# Patient Record
Sex: MALE | Race: WHITE | Employment: FULL TIME | URBAN - METROPOLITAN AREA
[De-identification: names, ages, dates, MRNs, and addresses within clinical notes are randomized per-mention and may not be internally consistent; named-entity substitution may affect disease eponyms.]

---

## 2017-12-26 ENCOUNTER — HOSPITAL ENCOUNTER (EMERGENCY)
Facility: CLINIC | Age: 37
Discharge: HOME | End: 2017-12-26
Attending: EMERGENCY MEDICINE

## 2017-12-26 ENCOUNTER — APPOINTMENT (OUTPATIENT)
Dept: ULTRASOUND IMAGING | Facility: CLINIC | Age: 37
End: 2017-12-26

## 2017-12-26 VITALS
TEMPERATURE: 98.5 F | RESPIRATION RATE: 16 BRPM | WEIGHT: 310 LBS | DIASTOLIC BLOOD PRESSURE: 87 MMHG | HEIGHT: 71 IN | OXYGEN SATURATION: 96 % | BODY MASS INDEX: 43.4 KG/M2 | SYSTOLIC BLOOD PRESSURE: 185 MMHG | HEART RATE: 105 BPM

## 2017-12-26 DIAGNOSIS — L03.125 ACUTE LYMPHANGITIS OF RIGHT LOWER EXTREMITY: Primary | ICD-10-CM

## 2017-12-26 DIAGNOSIS — L03.115 CELLULITIS OF RIGHT LOWER EXTREMITY: ICD-10-CM

## 2017-12-26 LAB
DIRECT EXAM: NORMAL
Lab: NORMAL
SPECIMEN DESCRIPTION: NORMAL
STATUS: NORMAL

## 2017-12-26 PROCEDURE — 99283 EMERGENCY DEPT VISIT LOW MDM: CPT

## 2017-12-26 PROCEDURE — 87804 INFLUENZA ASSAY W/OPTIC: CPT

## 2017-12-26 PROCEDURE — 93971 EXTREMITY STUDY: CPT

## 2017-12-26 RX ORDER — MULTIVIT WITH MINERALS/LUTEIN
250 TABLET ORAL DAILY
COMMUNITY

## 2017-12-26 RX ORDER — DOXYCYCLINE 100 MG/1
100 TABLET ORAL 2 TIMES DAILY
Qty: 14 TABLET | Refills: 0 | Status: ON HOLD | OUTPATIENT
Start: 2017-12-26 | End: 2018-01-04 | Stop reason: HOSPADM

## 2017-12-26 RX ORDER — LANOLIN ALCOHOL/MO/W.PET/CERES
250 CREAM (GRAM) TOPICAL NIGHTLY
COMMUNITY

## 2017-12-26 RX ORDER — VITAMIN B COMPLEX
1 CAPSULE ORAL DAILY
COMMUNITY

## 2017-12-26 ASSESSMENT — PAIN DESCRIPTION - ORIENTATION: ORIENTATION: RIGHT

## 2017-12-26 ASSESSMENT — PAIN DESCRIPTION - PAIN TYPE: TYPE: ACUTE PAIN

## 2017-12-26 ASSESSMENT — PAIN DESCRIPTION - LOCATION: LOCATION: LEG

## 2017-12-26 ASSESSMENT — PAIN SCALES - GENERAL: PAINLEVEL_OUTOF10: 5

## 2017-12-26 NOTE — ED TRIAGE NOTES
Pt presents to the ED with c/o right leg cramping and flu symptoms. Pt has symptoms of nasal congestion, cough, fevers, chills. Pt reports long car rides over the past two days and has reddened areas that are painful to the right leg. Pt denies clot hx. Pt is alert and oriented x4. Ambulatory. Respirations even and non-labored. Will continue to monitor.

## 2017-12-26 NOTE — ED PROVIDER NOTES
[facility]  eMERGENCY dEPARTMENT eNCOUnter      Pt Name: Tracey Oneil  MRN: [id number]  [place] 1980  Date of evaluation: 12/26/2017      CHIEF COMPLAINT       Chief Complaint   Patient presents with    Leg Swelling    Cough         HISTORY OF PRESENT ILLNESS      The patient presents to the emergency department with redness and swelling of his right lower extremity. He recently traveled to and from [place] over the holiday weekend. He is having red streaking and pain in his right calf and into the thigh. He has never had a DVT before. He does have a history of lymphedema. He was wearing compression stockings this weekend, but it was too painful to do so today. He denies prior history of cellulitis. He has also had URI symptoms, but is not sure that he's had a fever. He denies difficulty breathing or swallowing. He is able to eat and drink normally. REVIEW OF SYSTEMS       All systems reviewed and negative unless noted in HPI. The patient denies fever. Denies vision change. Recent sore throat and rhinorrhea. Denies any neck pain or stiffness. Denies chest pain or shortness of breath. No nausea,  vomiting or diarrhea. Denies any dysuria. Denies urinary frequency or hematuria. Denies musculoskeletal injury or pain. Denies any weakness, numbness or focal neurologic deficit. History of lymphedema and chronic stasis change. Right leg redness and swelling. No recent psychiatric issues. No easy bruising or bleeding. Denies any polyuria, polydypsia or history of immunocompromise. PAST MEDICAL HISTORY    has a past medical history of Hyperlipidemia and Lymphedema. SURGICAL HISTORY      has a past surgical history that includes [place] tooth extraction.     CURRENT MEDICATIONS       Previous Medications    ASCORBIC ACID (VITAMIN C) 250 MG TABLET    Take 250 mg by mouth daily    B COMPLEX VITAMINS CAPSULE    Take 1 capsule by mouth daily    NIACIN 250 MG EXTENDED RELEASE CAPSULE    Take 250 mg by mouth nightly    VITAMIN D (CHOLECALCIFEROL) 1000 UNIT TABS TABLET    Take 1,000 Units by mouth daily       ALLERGIES     has No Known Allergies. FAMILY HISTORY     has no family status information on file. family history is not on file. SOCIAL HISTORY      reports that he has never smoked. He has never used smokeless tobacco. He reports that he drinks alcohol. He reports that he does not use drugs. PHYSICAL EXAM     INITIAL VITALS:  height is 5' 11\" (1.803 m) and weight is 140.6 kg (310 lb) (abnormal). His oral temperature is 98.5 °F (36.9 °C). His blood pressure is 185/87 (abnormal) and his pulse is 105. His respiration is 16 and oxygen saturation is 96%. Patient is alert and oriented, in no apparent distress. HEENT is atraumatic. Pupils are PERRL at 4 mm. Mucous membranes moist.  Clear nasal rhinorrhea appreciated. Neck is supple with no lymphadenopathy. No JVD. No meningismus. Heart sounds regular rate and rhythm with no gallops, murmurs, or rubs. Lungs clear, no wheezes, rales or rhonchi. Abdomen: obese, soft, nontender with no pain to palpation. Normal bowel sounds are noted. No rebound or guarding. Musculoskeletal exam shows no evidence of trauma. Single pustule on patient's right leg. Lymphangitis noted on patient's right calf, crease of the right knee, and right thigh. Chronic stasis change bilaterally in lower extremities. Intact distal pulses. Skin: Chronic stasis change in edema. Right LE appearance as above. Neurological exam reveals cranial nerves 2 through 12 grossly intact. Patient has equal  and normal deep tendon reflexes. Psychiatric: no hallucinations or suicidal ideation. Lymphatics.:  No lymphadenopathy. History of lymphedema as noted. Chronic stasis change in lower extremities. Lymphangitis noted in right calf and thigh.       DIFFERENTIAL DIAGNOSIS/ MDM:     DVT, cellulitis, lymphangitis, influenza    DIAGNOSTIC RESULTS         RADIOLOGY:   I directly visualized the following  images and reviewed the radiologist interpretations:  US DUP LOWER EXTREMITY RIGHT GUY   Final Result   No evidence of DVT in the right lower extremity. Imaging Results         US DUP LOWER EXTREMITY RIGHT GUY (Final result)   Result time 12/26/17 18:55:37   Final result by Cece Louis MD (12/26/17 18:55:37)                Impression:    No evidence of DVT in the right lower extremity. Narrative:    EXAMINATION:  DUPLEX VENOUS ULTRASOUND OF THE RIGHT LOWER EXTREMITY, 12/26/2017 6:22 pm    TECHNIQUE:  Duplex ultrasound and Doppler images were obtained of the right lower  extremity. COMPARISON:  None. HISTORY:  ORDERING SYSTEM PROVIDED HISTORY: pain and swelling; redness  TECHNOLOGIST PROVIDED HISTORY:  Ordering Physician Provided Reason for Exam: Right leg pain and swelling x  couple days s/p car travel x many hrs over the holidays  Acuity: Acute  Type of Exam: Initial    FINDINGS:  The visualized veins of the right lower extremity are patent and free of  echogenic thrombus. The veins are normally compressible and have normal  phasic flow. LABS:  Results for orders placed or performed during the hospital encounter of 12/26/17   Rapid Influenza A/B Antigens   Result Value Ref Range    Specimen Description . NASOPHARYNGEAL SWAB     Special Requests NOT REPORTED     Direct Exam PRESUMPTIVE NEGATIVE for Influenza A + B antigens. Direct Exam       PCR testing to confirm this result is available upon request.  Specimen will be    Direct Exam        saved in the laboratory for 7 days. Please call [phone] if PCR testing is    Direct Exam  indicated.      Direct Exam       Performed at [facility] Emergency Dept and [facility], [address] [place] [address] Bronson Guzman, [address] [place] [address]     Status FINAL 12/26/2017          EMERGENCY DEPARTMENT COURSE: Vitals:    Vitals:    12/26/17 1753   BP: (!) 185/87   Pulse: 105   Resp: 16   Temp: 98.5 °F (36.9 °C)   TempSrc: Oral   SpO2: 96%   Weight: (!) 140.6 kg (310 lb)   Height: 5' 11\" (1.803 m)     -------------------------  BP: (!) 185/87, Temp: 98.5 °F (36.9 °C), Pulse: 105, Resp: 16      Re-evaluation Notes    At this time, the patient has lymphangitis and cellulitis without DVT. I explained that he needs to have a wound check in the next couple of days. If his symptoms worsen, I explained he would need hospitalization. The patient expressed understanding. He will be started on doxycycline. He should apply warm compresses and elevate. The patient is discharged in good condition. FINAL IMPRESSION      1. Acute lymphangitis of right lower extremity    2.  Cellulitis of right lower extremity          DISPOSITION/PLAN   DISPOSITION Decision To Discharge 12/26/2017 07:02:47 PM      Condition on Disposition    good    PATIENT REFERRED TO:  Gladys Prince MD  [address]  [phone]    In 3 days  For wound re-check      DISCHARGE MEDICATIONS:  New Prescriptions    DOXYCYCLINE MONOHYDRATE (ADOXA) 100 MG TABLET    Take 1 tablet by mouth 2 times daily for 7 days       (Please note that portions of this note were completed with a voice recognition program.  Efforts were made to edit the dictations but occasionally words are mis-transcribed.)    Storm MD   Attending Emergency Physician       Giulia Arango MD  12/26/17 9014

## 2017-12-27 NOTE — ED NOTES
Discharge instructions reviewed, prescriptions given, and follow up information given.      Manpreet Motley RN  12/26/17 7330

## 2018-01-02 ENCOUNTER — HOSPITAL ENCOUNTER (INPATIENT)
Age: 38
LOS: 2 days | Discharge: HOME | DRG: 603 | End: 2018-01-04
Attending: EMERGENCY MEDICINE | Admitting: INTERNAL MEDICINE

## 2018-01-02 DIAGNOSIS — L03.115 CELLULITIS OF LEG, RIGHT: Primary | ICD-10-CM

## 2018-01-02 PROBLEM — L03.90 CELLULITIS: Status: ACTIVE | Noted: 2018-01-02

## 2018-01-02 LAB
ABSOLUTE EOS #: 0.3 K/UL (ref 0–0.4)
ABSOLUTE IMMATURE GRANULOCYTE: ABNORMAL K/UL (ref 0–0.3)
ABSOLUTE LYMPH #: 2 K/UL (ref 1–4.8)
ABSOLUTE MONO #: 0.6 K/UL (ref 0.1–1.2)
ANION GAP SERPL CALCULATED.3IONS-SCNC: 12 MMOL/L (ref 9–17)
BASOPHILS # BLD: 1 % (ref 0–2)
BASOPHILS ABSOLUTE: 0.1 K/UL (ref 0–0.2)
BUN BLDV-MCNC: 20 MG/DL (ref 6–20)
BUN/CREAT BLD: ABNORMAL (ref 9–20)
CALCIUM SERPL-MCNC: 8.6 MG/DL (ref 8.6–10.4)
CHLORIDE BLD-SCNC: 103 MMOL/L (ref 98–107)
CO2: 25 MMOL/L (ref 20–31)
CREAT SERPL-MCNC: 0.9 MG/DL (ref 0.7–1.2)
DIFFERENTIAL TYPE: ABNORMAL
EOSINOPHILS RELATIVE PERCENT: 4 % (ref 1–4)
GFR AFRICAN AMERICAN: >60 ML/MIN
GFR NON-AFRICAN AMERICAN: >60 ML/MIN
GFR SERPL CREATININE-BSD FRML MDRD: ABNORMAL ML/MIN/{1.73_M2}
GFR SERPL CREATININE-BSD FRML MDRD: ABNORMAL ML/MIN/{1.73_M2}
GLUCOSE BLD-MCNC: 107 MG/DL (ref 70–99)
HCT VFR BLD CALC: 41.5 % (ref 41–53)
HEMOGLOBIN: 14.3 G/DL (ref 13.5–17.5)
IMMATURE GRANULOCYTES: ABNORMAL %
LYMPHOCYTES # BLD: 22 % (ref 24–44)
MCH RBC QN AUTO: 30.1 PG (ref 26–34)
MCHC RBC AUTO-ENTMCNC: 34.5 G/DL (ref 31–37)
MCV RBC AUTO: 87.4 FL (ref 80–100)
MONOCYTES # BLD: 7 % (ref 2–11)
PDW BLD-RTO: 14.1 % (ref 12.5–15.4)
PLATELET # BLD: 349 K/UL (ref 140–450)
PLATELET ESTIMATE: ABNORMAL
PMV BLD AUTO: 7.4 FL (ref 6–12)
POTASSIUM SERPL-SCNC: 3.9 MMOL/L (ref 3.7–5.3)
RBC # BLD: 4.75 M/UL (ref 4.5–5.9)
RBC # BLD: ABNORMAL 10*6/UL
SEG NEUTROPHILS: 66 % (ref 36–66)
SEGMENTED NEUTROPHILS ABSOLUTE COUNT: 5.8 K/UL (ref 1.8–7.7)
SODIUM BLD-SCNC: 140 MMOL/L (ref 135–144)
WBC # BLD: 8.8 K/UL (ref 3.5–11)
WBC # BLD: ABNORMAL 10*3/UL

## 2018-01-02 PROCEDURE — 6360000002 HC RX W HCPCS: Performed by: EMERGENCY MEDICINE

## 2018-01-02 PROCEDURE — 2580000003 HC RX 258: Performed by: EMERGENCY MEDICINE

## 2018-01-02 PROCEDURE — 2500000003 HC RX 250 WO HCPCS: Performed by: EMERGENCY MEDICINE

## 2018-01-02 PROCEDURE — 96365 THER/PROPH/DIAG IV INF INIT: CPT

## 2018-01-02 PROCEDURE — 80048 BASIC METABOLIC PNL TOTAL CA: CPT

## 2018-01-02 PROCEDURE — 85025 COMPLETE CBC W/AUTO DIFF WBC: CPT

## 2018-01-02 PROCEDURE — 36415 COLL VENOUS BLD VENIPUNCTURE: CPT

## 2018-01-02 PROCEDURE — 99284 EMERGENCY DEPT VISIT MOD MDM: CPT

## 2018-01-02 PROCEDURE — 1200000000 HC SEMI PRIVATE

## 2018-01-02 PROCEDURE — 87040 BLOOD CULTURE FOR BACTERIA: CPT

## 2018-01-02 RX ORDER — ONDANSETRON 2 MG/ML
4 INJECTION INTRAMUSCULAR; INTRAVENOUS EVERY 6 HOURS PRN
Status: DISCONTINUED | OUTPATIENT
Start: 2018-01-02 | End: 2018-01-04 | Stop reason: HOSPADM

## 2018-01-02 RX ORDER — SODIUM CHLORIDE 9 MG/ML
INJECTION, SOLUTION INTRAVENOUS CONTINUOUS
Status: DISCONTINUED | OUTPATIENT
Start: 2018-01-02 | End: 2018-01-04 | Stop reason: HOSPADM

## 2018-01-02 RX ORDER — POTASSIUM CHLORIDE 20MEQ/15ML
40 LIQUID (ML) ORAL PRN
Status: DISCONTINUED | OUTPATIENT
Start: 2018-01-02 | End: 2018-01-04 | Stop reason: HOSPADM

## 2018-01-02 RX ORDER — MULTIVIT WITH MINERALS/LUTEIN
250 TABLET ORAL DAILY
Status: DISCONTINUED | OUTPATIENT
Start: 2018-01-03 | End: 2018-01-04 | Stop reason: HOSPADM

## 2018-01-02 RX ORDER — SODIUM CHLORIDE 0.9 % (FLUSH) 0.9 %
10 SYRINGE (ML) INJECTION PRN
Status: DISCONTINUED | OUTPATIENT
Start: 2018-01-02 | End: 2018-01-04 | Stop reason: HOSPADM

## 2018-01-02 RX ORDER — MORPHINE SULFATE 4 MG/ML
4 INJECTION, SOLUTION INTRAMUSCULAR; INTRAVENOUS
Status: DISCONTINUED | OUTPATIENT
Start: 2018-01-02 | End: 2018-01-04 | Stop reason: HOSPADM

## 2018-01-02 RX ORDER — CLINDAMYCIN PHOSPHATE 900 MG/50ML
900 INJECTION INTRAVENOUS ONCE
Status: COMPLETED | OUTPATIENT
Start: 2018-01-02 | End: 2018-01-02

## 2018-01-02 RX ORDER — SODIUM CHLORIDE 0.9 % (FLUSH) 0.9 %
10 SYRINGE (ML) INJECTION EVERY 12 HOURS SCHEDULED
Status: DISCONTINUED | OUTPATIENT
Start: 2018-01-02 | End: 2018-01-04 | Stop reason: HOSPADM

## 2018-01-02 RX ORDER — CLINDAMYCIN PHOSPHATE 900 MG/50ML
900 INJECTION INTRAVENOUS EVERY 8 HOURS
Status: DISCONTINUED | OUTPATIENT
Start: 2018-01-03 | End: 2018-01-04 | Stop reason: HOSPADM

## 2018-01-02 RX ORDER — ACETAMINOPHEN 325 MG/1
650 TABLET ORAL EVERY 4 HOURS PRN
Status: DISCONTINUED | OUTPATIENT
Start: 2018-01-02 | End: 2018-01-04 | Stop reason: HOSPADM

## 2018-01-02 RX ORDER — POTASSIUM CHLORIDE 7.45 MG/ML
10 INJECTION INTRAVENOUS PRN
Status: DISCONTINUED | OUTPATIENT
Start: 2018-01-02 | End: 2018-01-04 | Stop reason: HOSPADM

## 2018-01-02 RX ORDER — HYDROCODONE BITARTRATE AND ACETAMINOPHEN 5; 325 MG/1; MG/1
1 TABLET ORAL EVERY 4 HOURS PRN
Status: DISCONTINUED | OUTPATIENT
Start: 2018-01-02 | End: 2018-01-04 | Stop reason: HOSPADM

## 2018-01-02 RX ORDER — MORPHINE SULFATE 2 MG/ML
2 INJECTION, SOLUTION INTRAMUSCULAR; INTRAVENOUS
Status: DISCONTINUED | OUTPATIENT
Start: 2018-01-02 | End: 2018-01-04 | Stop reason: HOSPADM

## 2018-01-02 RX ORDER — BISACODYL 10 MG
10 SUPPOSITORY, RECTAL RECTAL DAILY PRN
Status: DISCONTINUED | OUTPATIENT
Start: 2018-01-02 | End: 2018-01-04 | Stop reason: HOSPADM

## 2018-01-02 RX ORDER — 0.9 % SODIUM CHLORIDE 0.9 %
1000 INTRAVENOUS SOLUTION INTRAVENOUS ONCE
Status: COMPLETED | OUTPATIENT
Start: 2018-01-02 | End: 2018-01-02

## 2018-01-02 RX ORDER — HYDROCODONE BITARTRATE AND ACETAMINOPHEN 5; 325 MG/1; MG/1
2 TABLET ORAL EVERY 4 HOURS PRN
Status: DISCONTINUED | OUTPATIENT
Start: 2018-01-02 | End: 2018-01-04 | Stop reason: HOSPADM

## 2018-01-02 RX ORDER — POTASSIUM CHLORIDE 20 MEQ/1
40 TABLET, EXTENDED RELEASE ORAL PRN
Status: DISCONTINUED | OUTPATIENT
Start: 2018-01-02 | End: 2018-01-04 | Stop reason: HOSPADM

## 2018-01-02 RX ORDER — MAGNESIUM SULFATE 1 G/100ML
1 INJECTION INTRAVENOUS PRN
Status: DISCONTINUED | OUTPATIENT
Start: 2018-01-02 | End: 2018-01-04 | Stop reason: HOSPADM

## 2018-01-02 RX ORDER — KETOROLAC TROMETHAMINE 30 MG/ML
30 INJECTION, SOLUTION INTRAMUSCULAR; INTRAVENOUS ONCE
Status: COMPLETED | OUTPATIENT
Start: 2018-01-02 | End: 2018-01-02

## 2018-01-02 RX ORDER — NICOTINE 21 MG/24HR
1 PATCH, TRANSDERMAL 24 HOURS TRANSDERMAL DAILY PRN
Status: DISCONTINUED | OUTPATIENT
Start: 2018-01-02 | End: 2018-01-04 | Stop reason: HOSPADM

## 2018-01-02 RX ADMIN — KETOROLAC TROMETHAMINE 30 MG: 30 INJECTION, SOLUTION INTRAMUSCULAR at 21:51

## 2018-01-02 RX ADMIN — CLINDAMYCIN PHOSPHATE 900 MG: 18 INJECTION, SOLUTION INTRAMUSCULAR; INTRAVENOUS at 20:27

## 2018-01-02 RX ADMIN — SODIUM CHLORIDE 1000 ML: 9 INJECTION, SOLUTION INTRAVENOUS at 20:22

## 2018-01-02 ASSESSMENT — PAIN DESCRIPTION - FREQUENCY: FREQUENCY: CONTINUOUS

## 2018-01-02 ASSESSMENT — ENCOUNTER SYMPTOMS
DIARRHEA: 0
SORE THROAT: 0
ABDOMINAL PAIN: 0
SHORTNESS OF BREATH: 0
NAUSEA: 0
VOMITING: 0

## 2018-01-02 ASSESSMENT — PAIN DESCRIPTION - DESCRIPTORS: DESCRIPTORS: ACHING

## 2018-01-02 ASSESSMENT — PAIN SCALES - GENERAL
PAINLEVEL_OUTOF10: 1
PAINLEVEL_OUTOF10: 4

## 2018-01-02 ASSESSMENT — PAIN DESCRIPTION - ORIENTATION: ORIENTATION: RIGHT;LOWER

## 2018-01-02 ASSESSMENT — PAIN DESCRIPTION - LOCATION: LOCATION: LEG

## 2018-01-02 ASSESSMENT — PAIN DESCRIPTION - PAIN TYPE: TYPE: ACUTE PAIN

## 2018-01-03 VITALS
HEIGHT: 71 IN | HEART RATE: 78 BPM | BODY MASS INDEX: 44.1 KG/M2 | TEMPERATURE: 97.7 F | SYSTOLIC BLOOD PRESSURE: 125 MMHG | RESPIRATION RATE: 18 BRPM | OXYGEN SATURATION: 97 % | DIASTOLIC BLOOD PRESSURE: 82 MMHG | WEIGHT: 315 LBS

## 2018-01-03 PROBLEM — I89.0 CHRONIC ACQUIRED LYMPHEDEMA: Status: ACTIVE | Noted: 2018-01-03

## 2018-01-03 PROBLEM — G47.33 OBSTRUCTIVE SLEEP APNEA: Status: ACTIVE | Noted: 2018-01-03

## 2018-01-03 PROBLEM — E66.01 MORBID OBESITY WITH BMI OF 40.0-44.9, ADULT (HCC): Status: ACTIVE | Noted: 2018-01-03

## 2018-01-03 LAB
ABSOLUTE EOS #: 0.3 K/UL (ref 0–0.4)
ABSOLUTE IMMATURE GRANULOCYTE: ABNORMAL K/UL (ref 0–0.3)
ABSOLUTE LYMPH #: 1.9 K/UL (ref 1–4.8)
ABSOLUTE MONO #: 0.8 K/UL (ref 0.2–0.8)
ALBUMIN SERPL-MCNC: 3.6 G/DL (ref 3.5–5.2)
ALBUMIN/GLOBULIN RATIO: ABNORMAL (ref 1–2.5)
ALP BLD-CCNC: 65 U/L (ref 40–129)
ALT SERPL-CCNC: 37 U/L (ref 5–41)
ANION GAP SERPL CALCULATED.3IONS-SCNC: 10 MMOL/L (ref 9–17)
AST SERPL-CCNC: 22 U/L
BASOPHILS # BLD: 0 % (ref 0–2)
BASOPHILS ABSOLUTE: 0 K/UL (ref 0–0.2)
BILIRUB SERPL-MCNC: 0.32 MG/DL (ref 0.3–1.2)
BUN BLDV-MCNC: 17 MG/DL (ref 6–20)
BUN/CREAT BLD: 16 (ref 9–20)
CALCIUM SERPL-MCNC: 7.8 MG/DL (ref 8.6–10.4)
CHLORIDE BLD-SCNC: 105 MMOL/L (ref 98–107)
CO2: 26 MMOL/L (ref 20–31)
CREAT SERPL-MCNC: 1.05 MG/DL (ref 0.7–1.2)
DIFFERENTIAL TYPE: ABNORMAL
EOSINOPHILS RELATIVE PERCENT: 4 % (ref 1–4)
GFR AFRICAN AMERICAN: >60 ML/MIN
GFR NON-AFRICAN AMERICAN: >60 ML/MIN
GFR SERPL CREATININE-BSD FRML MDRD: ABNORMAL ML/MIN/{1.73_M2}
GFR SERPL CREATININE-BSD FRML MDRD: ABNORMAL ML/MIN/{1.73_M2}
GLUCOSE BLD-MCNC: 103 MG/DL (ref 70–99)
HCT VFR BLD CALC: 37.9 % (ref 41–53)
HEMOGLOBIN: 12.7 G/DL (ref 13.5–17.5)
IMMATURE GRANULOCYTES: ABNORMAL %
LYMPHOCYTES # BLD: 25 % (ref 24–44)
MCH RBC QN AUTO: 29.9 PG (ref 26–34)
MCHC RBC AUTO-ENTMCNC: 33.5 G/DL (ref 31–37)
MCV RBC AUTO: 89.3 FL (ref 80–100)
MONOCYTES # BLD: 10 % (ref 1–7)
PDW BLD-RTO: 14.4 % (ref 11.5–14.5)
PLATELET # BLD: 351 K/UL (ref 130–400)
PLATELET ESTIMATE: ABNORMAL
PMV BLD AUTO: 7.2 FL (ref 6–12)
POTASSIUM SERPL-SCNC: 4 MMOL/L (ref 3.7–5.3)
RBC # BLD: 4.24 M/UL (ref 4.5–5.9)
RBC # BLD: ABNORMAL 10*6/UL
SEG NEUTROPHILS: 61 % (ref 36–66)
SEGMENTED NEUTROPHILS ABSOLUTE COUNT: 4.7 K/UL (ref 1.8–7.7)
SODIUM BLD-SCNC: 141 MMOL/L (ref 135–144)
TOTAL PROTEIN: 6.5 G/DL (ref 6.4–8.3)
WBC # BLD: 7.8 K/UL (ref 3.5–11)
WBC # BLD: ABNORMAL 10*3/UL

## 2018-01-03 PROCEDURE — 2580000003 HC RX 258: Performed by: NURSE PRACTITIONER

## 2018-01-03 PROCEDURE — 1200000000 HC SEMI PRIVATE

## 2018-01-03 PROCEDURE — 85025 COMPLETE CBC W/AUTO DIFF WBC: CPT

## 2018-01-03 PROCEDURE — 36415 COLL VENOUS BLD VENIPUNCTURE: CPT

## 2018-01-03 PROCEDURE — 2500000003 HC RX 250 WO HCPCS: Performed by: NURSE PRACTITIONER

## 2018-01-03 PROCEDURE — 6370000000 HC RX 637 (ALT 250 FOR IP): Performed by: NURSE PRACTITIONER

## 2018-01-03 PROCEDURE — 99222 1ST HOSP IP/OBS MODERATE 55: CPT | Performed by: INTERNAL MEDICINE

## 2018-01-03 PROCEDURE — 6360000002 HC RX W HCPCS: Performed by: NURSE PRACTITIONER

## 2018-01-03 PROCEDURE — 80053 COMPREHEN METABOLIC PANEL: CPT

## 2018-01-03 RX ORDER — DIPHENHYDRAMINE HCL 25 MG
25 TABLET ORAL EVERY 6 HOURS PRN
Status: DISCONTINUED | OUTPATIENT
Start: 2018-01-03 | End: 2018-01-04 | Stop reason: HOSPADM

## 2018-01-03 RX ADMIN — ASCORBIC ACID TAB 250 MG 250 MG: 250 TAB at 09:29

## 2018-01-03 RX ADMIN — CLINDAMYCIN PHOSPHATE 900 MG: 18 INJECTION, SOLUTION INTRAMUSCULAR; INTRAVENOUS at 21:00

## 2018-01-03 RX ADMIN — CLINDAMYCIN PHOSPHATE 900 MG: 18 INJECTION, SOLUTION INTRAMUSCULAR; INTRAVENOUS at 04:33

## 2018-01-03 RX ADMIN — CLINDAMYCIN PHOSPHATE 900 MG: 18 INJECTION, SOLUTION INTRAMUSCULAR; INTRAVENOUS at 12:20

## 2018-01-03 RX ADMIN — SODIUM CHLORIDE: 9 INJECTION, SOLUTION INTRAVENOUS at 00:30

## 2018-01-03 RX ADMIN — SODIUM CHLORIDE, PRESERVATIVE FREE 10 ML: 5 INJECTION INTRAVENOUS at 00:35

## 2018-01-03 RX ADMIN — ENOXAPARIN SODIUM 40 MG: 40 INJECTION SUBCUTANEOUS at 09:29

## 2018-01-03 RX ADMIN — DIPHENHYDRAMINE HCL 25 MG: 25 TABLET ORAL at 21:13

## 2018-01-03 RX ADMIN — SODIUM CHLORIDE: 9 INJECTION, SOLUTION INTRAVENOUS at 11:41

## 2018-01-03 NOTE — H&P
[address] [facility]  Internal Medicine History and Physical      Name: Zander Downey  :   1980  MRN: [id number]     Acct: [de-identified]  [de-identified]  Admit Date: 2018  Hospital: [address] [place] [address]    PCP: Monique Jefferson M.D., MD    CHIEF COMPLAINT:    Chief Complaint   Patient presents with    Cellulitis     right leg x1.5 weeks       History Obtained From:  patient, electronic medical record    HISTORY OF PRESENT ILLNESS:    Zander Downey is a 40 y.o.  male who presents from the emergency department with a complaint of cellulitis of his right leg. Patient reports that he was seen in the emergency room prior and had a venous Doppler  (his 4th) that was unremarkable. He was placed on doxycycline which she completed the morning of admission after 7 days of therapy. The right lower extremity edema is chronic. The erythema has only improved minimally. He is currently on Cleocin 900 mg every 8 hours. Patient has a history of chronic right lower extremity lymphedema. He [place] [facility] and is currently enrolled in the l[place]hedema clinic at [facility]. Evidently attempted to access those records however they are not available. He also has a history of morbid obesity and sleep apnea. He has been using CPAP for the past year. Patient has history of hyperlipidemia. Past Medical History:    Past Medical History:   Diagnosis Date    Hyperlipidemia     Lymphedema         Past Surgical History:    Past Surgical History:   Procedure Laterality Date    WISDOM TOOTH EXTRACTION          Medications Prior to Admission:     Prior to Admission medications    Medication Sig Start Date End Date Taking?  Authorizing Provider   niacin 250 MG extended release capsule Take 250 mg by mouth nightly   Yes Historical Provider, MD   b complex vitamins capsule Take 1 capsule by mouth daily   Yes Historical Provider, MD   Ascorbic Acid (VITAMIN C) 250 MG tablet Take 250 mg

## 2018-01-03 NOTE — ED NOTES
Spoke with Valdo Britt (supervisor at [facility]) room 2005 given however 4-5 patients are ahead of Mr. Samuels. Stat clean has been implemented.      Marielos Jeong RN  01/02/18 8925

## 2018-01-03 NOTE — PROGRESS NOTES
Pharmacy Accuracy Service Medication History Note    The patient's list of current home medications has been reviewed. He does not take any prescription maintenance medications. Source(s) of information: patient, electronic chart    Based on information provided by the above source(s), no changes to the patient's home medication list were necessary. Discrepancies on current hospital orders that need to be addressed by a physician:      Please feel free to call me with any questions about this encounter. Thank you.     Cayetano Marks PharmD  Pharmacy Medication Accuracy Review Service  Phone:  [phone]  Fax: [phone]      Electronically signed by ANSHU Patel [facility] on 1/3/2018 at 6:24 PM

## 2018-01-03 NOTE — CARE COORDINATION
for law firm. Started having swelling and cramping rt [id number]. Developed redness 12-25-18 and went to [facility] ER and sent home on PO ATB. Saw PCP 12-29-18 and continued on ATB until yesterday. Went to [facility] [address] [facility] [place] yesterday since pt felt he may need more ATB. Sent to [facility] ER and admitted. Pt states has history of lymphedema and wears Jobst stockings. Has not been seen at lymphedema clinic at [facility] for 1 1/2 -2 years. Pt is self pay and over income for Medicaid or script assist per Raj Alatorre in HELP. Wife has new job and not yet covered by insurance. No needs identified at this time.                             Electronically signed by Gabino Serrano RN on 1/3/18 at 11:54 AM

## 2018-01-03 NOTE — ED PROVIDER NOTES
[facility]  [address] [facility] [address]  Phone: [phone]    eMERGENCY dEPARTMENT eNCOUnter          Pt Name: Gladis Hinton  MRN: [id number]  [place] 1980  Date of evaluation: 1/2/2018      CHIEF COMPLAINT       Chief Complaint   Patient presents with    Cellulitis     right leg x1.5 weeks       HISTORY OF PRESENT ILLNESS         Gladis Hinton is a 40 y.o. male who presents with cellulitis to his right leg. Patient reports he was seen here recently and had a DVT study that was unremarkable. he tells me that he took his last dose of doxycycline this morning at 8 AM after 1 week of being on the medication. He reports the edema is not improved over the erythema has improved minimally. He denies any history of DVTs in the past.  Denies any chest pain or difficulty breathing. Denies fevers. Does have minimal discomfort with the leg. Does not report any worsening swelling just not improving at this time. Denies other concerns. No trauma. There is a scab to the right mid shin that he reports is where the erythema began. REVIEW OF SYSTEMS         Review of Systems   Constitutional: Negative for chills and fever. HENT: Negative for congestion and sore throat. Respiratory: Negative for shortness of breath. Cardiovascular: Negative for chest pain. Gastrointestinal: Negative for abdominal pain, diarrhea, nausea and vomiting. Musculoskeletal: Negative for neck pain. Skin: Positive for rash and wound. Neurological: Negative for weakness and numbness. All other systems reviewed and are negative. PAST MEDICAL HISTORY    has a past medical history of Hyperlipidemia and Lymphedema. SURGICAL HISTORY      has a past surgical history that includes [place] tooth extraction.     CURRENT MEDICATIONS       Previous Medications    ASCORBIC ACID (VITAMIN C) 250 MG TABLET    Take 250 mg by mouth daily    B COMPLEX VITAMINS CAPSULE    Take 1 capsule by mouth daily

## 2018-01-04 LAB
ABSOLUTE EOS #: 0.3 K/UL (ref 0–0.4)
ABSOLUTE IMMATURE GRANULOCYTE: ABNORMAL K/UL (ref 0–0.3)
ABSOLUTE LYMPH #: 1.8 K/UL (ref 1–4.8)
ABSOLUTE MONO #: 0.8 K/UL (ref 0.2–0.8)
ALBUMIN SERPL-MCNC: 3.6 G/DL (ref 3.5–5.2)
ALBUMIN/GLOBULIN RATIO: ABNORMAL (ref 1–2.5)
ALP BLD-CCNC: 66 U/L (ref 40–129)
ALT SERPL-CCNC: 42 U/L (ref 5–41)
ANION GAP SERPL CALCULATED.3IONS-SCNC: 10 MMOL/L (ref 9–17)
AST SERPL-CCNC: 25 U/L
BASOPHILS # BLD: 1 % (ref 0–2)
BASOPHILS ABSOLUTE: 0.1 K/UL (ref 0–0.2)
BILIRUB SERPL-MCNC: 0.32 MG/DL (ref 0.3–1.2)
BILIRUBIN DIRECT: 0.1 MG/DL
BILIRUBIN, INDIRECT: 0.22 MG/DL (ref 0–1)
BUN BLDV-MCNC: 12 MG/DL (ref 6–20)
CALCIUM SERPL-MCNC: 8.3 MG/DL (ref 8.6–10.4)
CHLORIDE BLD-SCNC: 103 MMOL/L (ref 98–107)
CHOLESTEROL/HDL RATIO: 7.6
CHOLESTEROL: 167 MG/DL
CO2: 27 MMOL/L (ref 20–31)
CREAT SERPL-MCNC: 1.04 MG/DL (ref 0.7–1.2)
DIFFERENTIAL TYPE: ABNORMAL
EOSINOPHILS RELATIVE PERCENT: 4 % (ref 1–4)
GFR AFRICAN AMERICAN: >60 ML/MIN
GFR NON-AFRICAN AMERICAN: >60 ML/MIN
GFR SERPL CREATININE-BSD FRML MDRD: ABNORMAL ML/MIN/{1.73_M2}
GFR SERPL CREATININE-BSD FRML MDRD: ABNORMAL ML/MIN/{1.73_M2}
GLUCOSE BLD-MCNC: 96 MG/DL (ref 70–99)
HCT VFR BLD CALC: 40.7 % (ref 41–53)
HDLC SERPL-MCNC: 22 MG/DL
HEMOGLOBIN: 13.6 G/DL (ref 13.5–17.5)
IMMATURE GRANULOCYTES: ABNORMAL %
LDL CHOLESTEROL: 87 MG/DL (ref 0–130)
LYMPHOCYTES # BLD: 26 % (ref 24–44)
MAGNESIUM: 2.4 MG/DL (ref 1.6–2.6)
MCH RBC QN AUTO: 29.3 PG (ref 26–34)
MCHC RBC AUTO-ENTMCNC: 33.3 G/DL (ref 31–37)
MCV RBC AUTO: 87.8 FL (ref 80–100)
MONOCYTES # BLD: 11 % (ref 1–7)
PDW BLD-RTO: 13 % (ref 11.5–14.5)
PLATELET # BLD: 392 K/UL (ref 130–400)
PLATELET ESTIMATE: ABNORMAL
PMV BLD AUTO: ABNORMAL FL (ref 6–12)
POTASSIUM SERPL-SCNC: 4.3 MMOL/L (ref 3.7–5.3)
RBC # BLD: 4.63 M/UL (ref 4.5–5.9)
RBC # BLD: ABNORMAL 10*6/UL
SEG NEUTROPHILS: 58 % (ref 36–66)
SEGMENTED NEUTROPHILS ABSOLUTE COUNT: 4.1 K/UL (ref 1.8–7.7)
SODIUM BLD-SCNC: 140 MMOL/L (ref 135–144)
TOTAL PROTEIN: 7 G/DL (ref 6.4–8.3)
TRIGL SERPL-MCNC: 292 MG/DL
VLDLC SERPL CALC-MCNC: ABNORMAL MG/DL (ref 1–30)
WBC # BLD: 7.1 K/UL (ref 3.5–11)
WBC # BLD: ABNORMAL 10*3/UL

## 2018-01-04 PROCEDURE — 80053 COMPREHEN METABOLIC PANEL: CPT

## 2018-01-04 PROCEDURE — 85025 COMPLETE CBC W/AUTO DIFF WBC: CPT

## 2018-01-04 PROCEDURE — 2580000003 HC RX 258: Performed by: NURSE PRACTITIONER

## 2018-01-04 PROCEDURE — 80061 LIPID PANEL: CPT

## 2018-01-04 PROCEDURE — 99253 IP/OBS CNSLTJ NEW/EST LOW 45: CPT | Performed by: INTERNAL MEDICINE

## 2018-01-04 PROCEDURE — 36415 COLL VENOUS BLD VENIPUNCTURE: CPT

## 2018-01-04 PROCEDURE — 2500000003 HC RX 250 WO HCPCS: Performed by: NURSE PRACTITIONER

## 2018-01-04 PROCEDURE — 82248 BILIRUBIN DIRECT: CPT

## 2018-01-04 PROCEDURE — 6360000002 HC RX W HCPCS: Performed by: NURSE PRACTITIONER

## 2018-01-04 PROCEDURE — 6370000000 HC RX 637 (ALT 250 FOR IP): Performed by: NURSE PRACTITIONER

## 2018-01-04 PROCEDURE — 99239 HOSP IP/OBS DSCHRG MGMT >30: CPT | Performed by: INTERNAL MEDICINE

## 2018-01-04 PROCEDURE — 83735 ASSAY OF MAGNESIUM: CPT

## 2018-01-04 RX ORDER — CLINDAMYCIN HYDROCHLORIDE 150 MG/1
150 CAPSULE ORAL 3 TIMES DAILY
Qty: 30 CAPSULE | Refills: 1 | Status: SHIPPED | OUTPATIENT
Start: 2018-01-04 | End: 2018-01-14

## 2018-01-04 RX ORDER — CLINDAMYCIN HYDROCHLORIDE 300 MG/1
300 CAPSULE ORAL 3 TIMES DAILY
Qty: 30 CAPSULE | Refills: 1 | Status: SHIPPED | OUTPATIENT
Start: 2018-01-04 | End: 2018-01-14

## 2018-01-04 RX ADMIN — CLINDAMYCIN PHOSPHATE 900 MG: 18 INJECTION, SOLUTION INTRAMUSCULAR; INTRAVENOUS at 03:54

## 2018-01-04 RX ADMIN — SODIUM CHLORIDE: 9 INJECTION, SOLUTION INTRAVENOUS at 02:34

## 2018-01-04 RX ADMIN — CLINDAMYCIN PHOSPHATE 900 MG: 18 INJECTION, SOLUTION INTRAMUSCULAR; INTRAVENOUS at 12:02

## 2018-01-04 RX ADMIN — ASCORBIC ACID TAB 250 MG 250 MG: 250 TAB at 08:19

## 2018-01-04 RX ADMIN — ENOXAPARIN SODIUM 40 MG: 40 INJECTION SUBCUTANEOUS at 08:19

## 2018-01-04 NOTE — DISCHARGE SUMMARY
[facility] Medicine Discharge Summary      Patient ID: Frances Taylor  : 1980  MRN: [id number]     Acct:  [de-identified]   Hospital: [facility]      Patient's PCP: Gatito Obrien M.D., MD    Admit Date: 2018     Discharge Date:   2018     Admitting Physician: Rc Ruiz MD    Discharge Physician: Gary Garcia DO     Discharge Diagnoses:    Primary Problem  Cellulitis of leg, right    Active Hospital Problems    Diagnosis Date Noted    Lymphedema of right lower extremity [I89.0]     Chronic acquired lymphedema [I89.0] 2018    Obstructive sleep apnea [G47.33] 2018    Morbid obesity with BMI of 40.0-44.9, adult ([facility] [address] [E66.01, Z68.41] 2018    Cellulitis of leg, right [L03.115] 2018    Cellulitis [L03.90] 2018       Recommendations:  1. Cleocin 450 mg by mouth every 8 hours for 10 days  2. Resume home medications  3. Use Jobst stockings while awake  4. Referral back to [facility] lymphedema clinic to evaluate for home lymphatic compression boot  5. Patient might benefit from an increase in his niacin to raise his HDL  6. Home CPAP    Past Medical History:   Diagnosis Date    Hyperlipidemia     Lymphedema      The patient was seen and examined on day of discharge and this discharge summary is in conjunction with any daily progress note from day of discharge. Code Status:  Full Code    Hospital Course:     Chief Complaint   Patient presents with    Cellulitis       right leg x1.5 weeks         Interval History: Status: significantly improved. Erythema is much reduced. The swelling is still present but is chronic. There appears to be small punctate areas that were the entry point for his infection. Patient denies pain. The patient has never been tried on Lymphapress boots. The patient does Jobst stockings however he wears them intermittently.  He states that he has not been to [place] [facility] [place] in 103  105  103   CO2  25  26  27   GLUCOSE  107*  103*  96   BUN  20  17  12   CREATININE  0.90  1.05  1.04   MG   --    --   2.4   ANIONGAP  12  10  10   LABGLOM  >60  >60  >60   GFRAA  >60  >60  >60   CALCIUM  8.6  7.8*  8.3*           Recent Labs      18   0539  18   0547   PROT  6.5  7.0   LABALBU  3.6  3.6   AST  22  25   ALT  37  42*   ALKPHOS  65  66   BILITOT  0.32  0.32   BILIDIR   --   0.10   CHOL   --   167   HDL   --   22*   LDLCHOLESTEROL   --   87   CHOLHDLRATIO   --   7.6*   TRIG   --   292*   VLDL   --   NOT REPORTED      Treatments: as above    Disposition: home    Discharged Condition: Stable    Follow Up:  Yovana Nogueira M.D., MD in one week    Discharge Medications:    [email or web address] [email or web address] Medication Instructions JE    Printed on:18 2229   Medication Information                      Ascorbic Acid (VITAMIN C) 250 MG tablet  Take 250 mg by mouth daily             b complex vitamins capsule  Take 1 capsule by mouth daily             clindamycin (CLEOCIN) 150 MG capsule  Take 1 capsule by mouth 3 times daily for 10 days Take with clindamycin 300 mg capsules 3 times a day for a total of 450 mg 3 times a day for 10 days             clindamycin (CLEOCIN) 300 MG capsule  Take 1 capsule by mouth 3 times daily for 10 days Take with clindamycin 150 mg capsules 3 times a day to make a total of 450 mg 3 times daily for 10 days             niacin 250 MG extended release capsule  Take 250 mg by mouth nightly             vitamin D (CHOLECALCIFEROL) 1000 UNIT TABS tablet  Take 1,000 Units by mouth daily                  Activity: activity as tolerated    Diet: regular diet    Time Spent on discharge is more than 30 minutes in the examination, evaluation, counseling and review of medications and discharge plan.       Electronically signed by Viann Hashimoto, DO on 2018 at 1:37 PM     Thank you Dr. Yovana Nogueira M.D., MD for the opportunity to be involved in this patient's

## 2018-01-04 NOTE — FLOWSHEET NOTE
Pt discharged to home   Pt educated on new medication and hand out given  Pt verbalized understanding of home care and follow up  No further questions verbalized.

## 2018-01-04 NOTE — CONSULTS
for follow-up. He called his PCP on she's a was not able to be scheduled to be seen so he ended up going to urgent care Center who sent him to the [facility] ER and he was subsequently admitted. Started on IV antimicrobial therapy with clindamycin for the cellulitis and now is asked to evaluate and help with antibiotic choice. The patient does not report any associated fevers, chills, abdominal pain, nausea, vomiting, diarrhea. No dysuria or frequency. No arthralgias or myalgias. He does not recall any prior trauma or wounds in the lower extremity. I have personally reviewed the past medical history, past surgical history, medications, social history, and family history, and I have updated the database accordingly. Past Medical History:     Past Medical History:   Diagnosis Date    Hyperlipidemia     Lymphedema      Past Surgical  History:     Past Surgical History:   Procedure Laterality Date    WISDOM TOOTH EXTRACTION       Medications:      vitamin C  250 mg Oral Daily    sodium chloride flush  10 mL Intravenous 2 times per day    enoxaparin  40 mg Subcutaneous Daily    clindamycin (CLEOCIN) IV  900 mg Intravenous Q8H     Social History:     Social History     Social History    Marital status:      Spouse name: N/A    Number of children: N/A    Years of education: N/A     Occupational History    Not on file. Social History Main Topics    Smoking status: Never Smoker    Smokeless tobacco: Never Used    Alcohol use Yes      Comment: social    Drug use: No    Sexual activity: Not on file     Other Topics Concern    Not on file     Social History Narrative    No narrative on file     Family History:   History reviewed. No pertinent family history. Allergies:   Review of patient's allergies indicates no known allergies. Review of Systems:   General: No fevers or chills. Eyes: No double vision or blurry vision. ENT: No sore throat or runny nose.   Cardiovascular: No chest pain

## 2018-01-04 NOTE — PLAN OF CARE
Problem: Pain:  Goal: Pain level will decrease  Pain level will decrease   Outcome: Ongoing  Numerical pain assessment completed.   Pain medication given  Pt instructed to call for pain    Problem: SAFETY  Goal: Free from accidental physical injury  Outcome: Ongoing  Bed in lowest position  Call light within reach  Hourly checks

## 2018-01-04 NOTE — PLAN OF CARE
Problem: Pain:  Goal: Pain level will decrease  Pain level will decrease   Outcome: Ongoing  Pt denies pain at this time    Problem: SAFETY  Goal: Free from accidental physical injury  Outcome: Ongoing      Problem: DAILY CARE  Goal: Daily care needs are met  Outcome: Ongoing      Problem: SKIN INTEGRITY  Goal: Skin integrity is maintained or improved  Outcome: Ongoing  Leg improving

## 2018-01-04 NOTE — FLOWSHEET NOTE
visited patient who was patiently waiting for his pain medications to kick in. Patient has infection in leg. (Cellulitis) Patient expressed no other needs  At this time.  provided ministry of presence. Patient stated that his Father was a . Patient was receptive and grateful for my visit.      01/03/18 1901   Encounter Summary   Services provided to: Patient   Referral/Consult From: Sonya Wang Visiting (1/3/18)   Complexity of Encounter Low   Length of Encounter 15 minutes   Spiritual Assessment Completed Yes   Routine   Type Initial   Assessment Approachable   Intervention Active listening;Explored feelings, thoughts, concerns;Sustaining presence/ Ministry of presence   Outcome Expressed gratitude;Engaged in conversation;Receptive

## 2018-01-04 NOTE — PROGRESS NOTES
[facility] - Progress Note    2018   11:59 AM    Name:  Shona Diaz  :    1980  Age:  40 y.o. male  MRN:    [id number]     Acct:     [de-identified]   Room:    IP Day: 2  Hospital: [facility] Date: 2018  7:33 PM  PCP: Page Root M.D., MD    Subjective:     C/C:   Chief Complaint   Patient presents with    Cellulitis     right leg x1.5 weeks       Interval History: Status: significantly improved. Erythema is much reduced. The swelling is still present but is chronic. There appears to be small punctate areas that were the entry point for his infection. Patient denies pain. The patient has never been tried on Lymphapress boots. The patient does Jobst stockings however he wears them intermittently. He states that he has not been to the [facility] lymphedema clinic in quite some time. I stressed the importance of using his stockings to prevent the swelling that is causing the skin breakdown which is the entry point for his recurring cellulitis. He does understand this. Triglycerides are mildly elevated at 292. His HDL is markedly low at 22. He might benefit from an increase in his niacin. This will need to be discussed with his primary care physician. Case has been discussed with infectious disease. If he can be switched to oral antibiotics he will be discharged today. History: \"Aramis Samuels is a 40 y.o.  male who presents from the emergency department with a complaint of cellulitis of his right leg. Patient reports that he was seen in the emergency room prior and had a venous Doppler  (his 4th) that was unremarkable. He was placed on doxycycline which she completed the morning of admission after 7 days of therapy. The right lower extremity edema is chronic. The erythema has only improved minimally. He is currently on Cleocin 900 mg every 8 hours. Patient has a history of chronic right lower extremity lymphedema.  He was seen by IVPB (Peripheral Line) PRN   magnesium sulfate 1 g in dextrose 5% 100 mL IVPB PRN   acetaminophen (TYLENOL) tablet 650 mg Q4H PRN   HYDROcodone-acetaminophen (NORCO) 5-325 MG per tablet 1 tablet Q4H PRN   Or    HYDROcodone-acetaminophen (NORCO) 5-325 MG per tablet 2 tablet Q4H PRN   morphine injection 2 mg Q2H PRN   Or    morphine injection 4 mg Q2H PRN   magnesium hydroxide (MILK OF MAGNESIA) 400 MG/5ML suspension 30 mL Daily PRN   bisacodyl (DULCOLAX) suppository 10 mg Daily PRN   ondansetron (ZOFRAN) injection 4 mg Q6H PRN   nicotine (NICODERM CQ) 21 MG/24HR 1 patch Daily PRN   enoxaparin (LOVENOX) injection 40 mg Daily   clindamycin (CLEOCIN) 900 mg in dextrose 5 % 50 mL IVPB Q8H       Data:     Code Status:  Full Code    Labs:    Hematology:  Recent Labs      01/02/18 2028 01/03/18   0539  01/04/18   0547   WBC  8.8  7.8  7.1   RBC  4.75  4.24*  4.63   HGB  14.3  12.7*  13.6   HCT  41.5  37.9*  40.7*   MCV  87.4  89.3  87.8   MCH  30.1  29.9  29.3   MCHC  34.5  33.5  33.3   RDW  14.1  14.4  13.0   PLT  349  351  392   MPV  7.4  7.2  NOT REPORTED     Chemistry:  Recent Labs      01/02/18 2028 01/03/18   0539  01/04/18   0547   NA  140  141  140   K  3.9  4.0  4.3   CL  103  105  103   CO2  25  26  27   GLUCOSE  107*  103*  96   BUN  20  17  12   CREATININE  0.90  1.05  1.04   MG   --    --   2.4   ANIONGAP  12  10  10   LABGLOM  >60  >60  >60   GFRAA  >60  >60  >60   CALCIUM  8.6  7.8*  8.3*     Recent Labs      01/03/18 0539  01/04/18   0547   PROT  6.5  7.0   LABALBU  3.6  3.6   AST  22  25   ALT  37  42*   ALKPHOS  65  66   BILITOT  0.32  0.32   BILIDIR   --   0.10   CHOL   --   167   HDL   --   22*   LDLCHOLESTEROL   --   87   CHOLHDLRATIO   --   7.6*   TRIG   --   292*   VLDL   --   NOT REPORTED       Physical Examination:    /82   Pulse 78   Temp 97.7 °F (36.5 °C) (Oral)   Resp 18   Ht 5' 11\" (1.803 m)   Wt (!) 320 lb 14.4 oz (145.6 kg)   SpO2 97%   BMI 44.76 kg/m²   Intake/Output

## 2021-05-19 ENCOUNTER — HOSPITAL ENCOUNTER (OUTPATIENT)
Age: 41
Discharge: HOME | End: 2021-05-19
Payer: COMMERCIAL

## 2021-05-19 LAB
ALBUMIN SERPL-MCNC: 4 G/DL (ref 3.5–5.2)
ALBUMIN/GLOBULIN RATIO: 1.3 (ref 1–2.5)
ALP BLD-CCNC: 82 U/L (ref 40–129)
ALT SERPL-CCNC: 38 U/L (ref 5–41)
ANION GAP SERPL CALCULATED.3IONS-SCNC: 8 MMOL/L (ref 9–17)
AST SERPL-CCNC: 22 U/L
BILIRUB SERPL-MCNC: 0.38 MG/DL (ref 0.3–1.2)
BUN BLDV-MCNC: 11 MG/DL (ref 6–20)
BUN/CREAT BLD: ABNORMAL (ref 9–20)
CALCIUM SERPL-MCNC: 8.2 MG/DL (ref 8.6–10.4)
CHLORIDE BLD-SCNC: 106 MMOL/L (ref 98–107)
CHOLESTEROL/HDL RATIO: 5.3
CHOLESTEROL: 144 MG/DL
CO2: 24 MMOL/L (ref 20–31)
CREAT SERPL-MCNC: 0.93 MG/DL (ref 0.7–1.2)
ESTIMATED AVERAGE GLUCOSE: 108 MG/DL
GFR AFRICAN AMERICAN: >60 ML/MIN
GFR NON-AFRICAN AMERICAN: >60 ML/MIN
GFR SERPL CREATININE-BSD FRML MDRD: ABNORMAL ML/MIN/{1.73_M2}
GFR SERPL CREATININE-BSD FRML MDRD: ABNORMAL ML/MIN/{1.73_M2}
GLUCOSE BLD-MCNC: 107 MG/DL (ref 70–99)
HBA1C MFR BLD: 5.4 % (ref 4–6)
HDLC SERPL-MCNC: 27 MG/DL
LDL CHOLESTEROL: 76 MG/DL (ref 0–130)
POTASSIUM SERPL-SCNC: 4.4 MMOL/L (ref 3.7–5.3)
SODIUM BLD-SCNC: 138 MMOL/L (ref 135–144)
TOTAL PROTEIN: 7.2 G/DL (ref 6.4–8.3)
TRIGL SERPL-MCNC: 205 MG/DL
VLDLC SERPL CALC-MCNC: ABNORMAL MG/DL (ref 1–30)

## 2021-05-19 PROCEDURE — 83036 HEMOGLOBIN GLYCOSYLATED A1C: CPT

## 2021-05-19 PROCEDURE — 80061 LIPID PANEL: CPT

## 2021-05-19 PROCEDURE — 80053 COMPREHEN METABOLIC PANEL: CPT

## 2021-05-19 PROCEDURE — 36415 COLL VENOUS BLD VENIPUNCTURE: CPT
